# Patient Record
Sex: MALE | Race: WHITE | HISPANIC OR LATINO | Employment: FULL TIME | URBAN - METROPOLITAN AREA
[De-identification: names, ages, dates, MRNs, and addresses within clinical notes are randomized per-mention and may not be internally consistent; named-entity substitution may affect disease eponyms.]

---
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
## 2022-08-22 ENCOUNTER — HOSPITAL ENCOUNTER (EMERGENCY)
Facility: MEDICAL CENTER | Age: 22
End: 2022-08-22
Attending: EMERGENCY MEDICINE
Payer: COMMERCIAL

## 2022-08-22 ENCOUNTER — APPOINTMENT (OUTPATIENT)
Dept: RADIOLOGY | Facility: MEDICAL CENTER | Age: 22
End: 2022-08-22
Attending: EMERGENCY MEDICINE
Payer: COMMERCIAL

## 2022-08-22 VITALS
RESPIRATION RATE: 14 BRPM | OXYGEN SATURATION: 99 % | HEART RATE: 84 BPM | SYSTOLIC BLOOD PRESSURE: 108 MMHG | TEMPERATURE: 98 F | WEIGHT: 106.04 LBS | DIASTOLIC BLOOD PRESSURE: 69 MMHG

## 2022-08-22 DIAGNOSIS — S39.012A STRAIN OF LUMBAR REGION, INITIAL ENCOUNTER: ICD-10-CM

## 2022-08-22 PROCEDURE — 700102 HCHG RX REV CODE 250 W/ 637 OVERRIDE(OP): Performed by: EMERGENCY MEDICINE

## 2022-08-22 PROCEDURE — 99283 EMERGENCY DEPT VISIT LOW MDM: CPT

## 2022-08-22 PROCEDURE — A9270 NON-COVERED ITEM OR SERVICE: HCPCS | Performed by: EMERGENCY MEDICINE

## 2022-08-22 PROCEDURE — 73522 X-RAY EXAM HIPS BI 3-4 VIEWS: CPT

## 2022-08-22 RX ORDER — CYCLOBENZAPRINE HCL 5 MG
5-10 TABLET ORAL 3 TIMES DAILY PRN
Qty: 30 TABLET | Refills: 0 | Status: SHIPPED | OUTPATIENT
Start: 2022-08-22

## 2022-08-22 RX ORDER — OXYCODONE HYDROCHLORIDE 5 MG/1
5 TABLET ORAL ONCE
Status: COMPLETED | OUTPATIENT
Start: 2022-08-22 | End: 2022-08-22

## 2022-08-22 RX ORDER — ACETAMINOPHEN 325 MG/1
650 TABLET ORAL ONCE
Status: COMPLETED | OUTPATIENT
Start: 2022-08-22 | End: 2022-08-22

## 2022-08-22 RX ORDER — NAPROXEN 500 MG/1
500 TABLET ORAL 2 TIMES DAILY WITH MEALS
Qty: 20 TABLET | Refills: 0 | Status: SHIPPED | OUTPATIENT
Start: 2022-08-22 | End: 2022-09-01

## 2022-08-22 RX ORDER — IBUPROFEN 600 MG/1
600 TABLET ORAL ONCE
Status: COMPLETED | OUTPATIENT
Start: 2022-08-22 | End: 2022-08-22

## 2022-08-22 RX ADMIN — IBUPROFEN 600 MG: 600 TABLET ORAL at 18:21

## 2022-08-22 RX ADMIN — ACETAMINOPHEN 650 MG: 325 TABLET ORAL at 18:21

## 2022-08-22 RX ADMIN — OXYCODONE 5 MG: 5 TABLET ORAL at 18:21

## 2022-08-22 NOTE — LETTER
FORM C-4:  EMPLOYEE’S CLAIM FOR COMPENSATION/ REPORT OF INITIAL TREATMENT  EMPLOYEE’S CLAIM - PROVIDE ALL INFORMATION REQUESTED   First Name   Venkatesh Last Name   Raul Boyle Birthdate   2000  Sex   male Claim Number   Home Address [address]   [facility]             [place] [address]                                   Age  22 y.o. Height    Weight  48.1 kg (106 lb 0.7 oz) N  [id number]   Mailing Address [address]  [facility]              [place] [address] Telephone  [phone] (home)  Primary Language Spoken  Azerbaijani   Insurer   Third Party   KODAK BROWNE Employee's Occupation (Job Title) When Injury or Occupational Disease Occurred  [employer]   Employer's Name    [employer] Telephone    [phone]    Employer Address   [address] [facility] [address]] Zip   [address]   Date of Injury  8/22/2022       Hour of Injury  10:00 AM Date Employer Notified  8/22/2022 Last Day of Work after Injury or Occupational Disease  8/22/2022 Supervisor to Whom Injury Reported  Farhan   Address or Location of Accident (if applicable) Work [1]   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 Be specific and answer in detail. Use additional sheet if necessary)  Se callo un rollo y be golpeo la espalda andaba mucho tiempo agachado y estaba levando unas carpetas muy yadiel creo que tambien eso me molesto mas mi espalda   If you believe that you have an occupational disease, when did you first have knowledge of the disability and it relationship to your employment? n/a Witnesses to the Accident  [employer]   Nature of Injury or Occupational Disease  Sprain Part(s) of Body Injured or Affected  Lower Back Area (Lumbar Area & Lumbo-Sacral), Upper Back Area (Thoracic Area), Spinal Cord - Trunk    I CERTIFY THAT THE ABOVE IS TRUE  AND CORRECT TO THE BEST OF MY KNOWLEDGE AND THAT I HAVE PROVIDED THIS INFORMATION IN ORDER TO OBTAIN THE BENEFITS OF [place]  I HEREBY AUTHORIZE ANY PHYSICIAN, CHIROPRACTOR, SURGEON, PRACTITIONER, OR OTHER PERSON, ANY HOSPITAL, INCLUDING [facility] OR [facility], ANY MEDICAL SERVICE ORGANIZATION, ANY INSURANCE COMPANY, OR OTHER INSTITUTION OR ORGANIZATION TO RELEASE TO EACH OTHER, ANY MEDICAL OR OTHER INFORMATION, INCLUDING BENEFITS PAID OR PAYABLE, PERTINENT TO THIS INJURY OR DISEASE, EXCEPT INFORMATION RELATIVE TO DIAGNOSIS, TREATMENT AND/OR COUNSELING FOR AIDS, PSYCHOLOGICAL CONDITIONS, ALCOHOL OR CONTROLLED SUBSTANCES, FOR WHICH I MUST GIVE SPECIFIC AUTHORIZATION.  A PHOTOSTAT OF THIS AUTHORIZATION SHALL BE AS VALID AS THE ORIGINAL.  Date    08/25/2022            Formerly [facility], later [facility]               Employee’s Signature   THIS REPORT MUST BE COMPLETED AND MAILED WITHIN 3 WORKING DAYS OF TREATMENT   Place [facility], EMERGENCY DEPT                       Name of Facility [facility]   Date  8/22/2022 Diagnosis  (S39.012A) Strain of lumbar region, initial encounter Is there evidence the injured employee was under the influence of alcohol and/or another controlled substance at the time of accident?   Hour  5:55 PM Description of Injury or Disease  Strain of lumbar region, initial encounter No   Treatment  Oral pain medication  Have you advised the patient to remain off work five days or more?         No   X-Ray Findings  Negative If Yes   From Date    To Date      From information given by the employee, together with medical evidence, can you directly connect this injury or occupational disease as job incurred?   Yes If No, is employee capable of: Full Duty  Yes Modified Duty      Is additional medical care by a physician indicated?   Yes  "If Modified Duty, Specify any Limitations / Restrictions       Do you know of any previous injury or disease contributing to this condition or occupational disease?   No    Date   8/25/2022 Print Doctor’s Name   Marcell Scott certify the employer’s copy of this form was mailed on:   Address [address] [place] [address]  [place] [id number] [phone] INSURER’S USE ONLY   Provider’s Tax ID Number     [id number] Telephone Dept:   [phone]    Doctor’s Signature   e-MARCELL Banerjee M.D. Degree     MD      Form C-4 (rev.10/07)                                                                         BRIEF DESCRIPTION OF RIGHTS AND BENEFITS  ([place]ursuant to [place] 616[place])    Notice of Injury or Occupational Disease (Incident Report Form C-1): If an injury or occupational disease (OD) arises out of and in the course of employment, you must provide written notice to your employer as soon as practicable, but no later than 7 days after the accident or OD. Your employer shall maintain a sufficient supply of the required forms.    Claim for Compensation (Form C-4): If medical treatment is sought, the form C-4 is available at the place of initial treatment. A completed \"Claim for Compensation\" (Form C-4) must be filed within 90 days after an accident or OD. The treating physician or chiropractor must, within 3 working days after treatment, complete and mail to the employer, the employer's insurer and third-party , the Claim for Compensation.    Medical Treatment: If you require medical treatment for your on-the-job injury or OD, you may be required to select a physician or chiropractor from a list provided by your workers’ compensation insurer, if it has contracted with an Organization for Managed Care (MCO) or Preferred Provider Organization (PPO) or providers of health care. If your employer has not entered into a contract with an MCO or PPO, you may select a physician or chiropractor from the Panel of Physicians and " Chiropractors. Any medical costs related to your industrial injury or OD will be paid by your insurer.    Temporary Total Disability (TTD): If your doctor has certified that you are unable to work for a period of at least 5 consecutive days, or 5 cumulative days in a 20-day period, or places restrictions on you that your employer does not accommodate, you may be entitled to TTD compensation.    Temporary Partial Disability (TPD): If the wage you receive upon reemployment is less than the compensation for TTD to which you are entitled, the insurer may be required to pay you TPD compensation to make up the difference. TPD can only be paid for a maximum of 24 months.    Permanent Partial Disability (PPD): When your medical condition is stable and there is an indication of a PPD as a result of your injury or OD, within 30 days, your insurer must arrange for an evaluation by a rating physician or chiropractor to determine the degree of your PPD. The amount of your PPD award depends on the date of injury, the results of the PPD evaluation, your age and wage.    Permanent Total Disability (PTD): If you are medically certified by a treating physician or chiropractor as permanently and totally disabled and have been granted a PTD status by your insurer, you are entitled to receive monthly benefits not to exceed 66 2/3% of your average monthly wage. The amount of your PTD payments is subject to reduction if you previously received a lump-sum PPD award.    Vocational Rehabilitation Services: You may be eligible for vocational rehabilitation services if you are unable to return to the job due to a permanent physical impairment or permanent restrictions as a result of your injury or occupational disease.    Transportation and Per Dameon Reimbursement: You may be eligible for travel expenses and per dameon associated with medical treatment.    Reopening: You may be able to reopen your claim if your condition worsens after claim  closure.     Appeal Process: If you disagree with a written determination issued by the insurer or the insurer does not respond to your request, you may appeal to the Department of Administration, , by following the instructions contained in your determination letter. You must appeal the determination within 70 days from the date of the determination letter at [address], [address], [place], [place] [address], or [address], [address], [place], [place] [address]. If you disagree with the  decision, you may appeal to the Department of Administration, . You must file your appeal within 30 days from the date of the  decision letter at [address], [address], [place], [place] [address], or [address], [address], [place], [place] [address]. If you disagree with a decision of an , you may file a petition for judicial review with the District Court. You must do so within 30 days of the Appeal Officer’s decision. You may be represented by an  at your own expense or you may contact the [facility] for possible representation.    [place]  for Injured Workers (NAIW): If you disagree with a  decision, you may request that NAIW represent you without charge at an  Hearing. For information regarding denial of benefits, you may contact the [facility] at: [address], [address], [place] [address], [phone]) [phone], or [address], [address], [place] [address], [phone]    To File a Complaint with the Division: If you wish to file a complaint with the  of the Division of Industrial Relations (DIR),  please contact the Workers’ Compensation Section, [address], [address], [place], [place] [address], telephone (925) [phone], or [address], [place], [place] [address], telephone ([phone]) [phone].    For assistance with Workers’  Compensation Issues: You may contact the [facility] Office for Consumer Health Assistance, [facility] [facility], [address], [place] [address], Toll Free 1-221.161.3736, Web site: http://[facility].[place].gov/Programs/ELSA E-mail: elsa@[facility].[place].gov  D-2 (rev. 10/20)              __________________________________________________________________                                    _________________            Employee Name / Signature                                                                                                                            Date

## 2022-08-22 NOTE — LETTER
FORM C-4:  EMPLOYEE’S CLAIM FOR COMPENSATION/ REPORT OF INITIAL TREATMENT  EMPLOYEE’S CLAIM - PROVIDE ALL INFORMATION REQUESTED   First Name Venkatesh Last Name Raul Birthdate 2000  Sex male Claim Number   Home Address [address]   [facility]             Zip [address]                                   Age  22 y.o. Height    Weight  48.1 kg (106 lb 0.7 oz) SSN  unknown   Mailing Address [address]  [facility]              Zip [address] Telephone  [phone] (home)  Primary Language Spoken   Insurer Third Party   ACCESS TO HEALTHCARE Employee's Occupation (Job Title) When Injury or Occupational Disease Occurred     Employer's Name  Telephone     Employer Address  City  State  Zip    Date of Injury  8/22/2022       Hour of Injury  10:00 AM Date Employer Notified  8/22/2022 Last Day of Work after Injury or Occupational Disease  8/22/2022 Supervisor to Whom Injury Reported  Farhan   Address or Location of Accident (if applicable) Work [1]   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 Be specific and answer in detail. Use additional sheet if necessary)  Se callo un rollo y be golpeo la espalda andaba mucho tiempo agachado y estaba levando unas carpetas muy yadiel creo que tambien eso me molesto mas mi espalda   If you believe that you have an occupational disease, when did you first have knowledge of the disability and it relationship to your employment? workers comp injury Witnesses to the Accident  work friend   Nature of Injury or Occupational Disease  Sprain Part(s) of Body Injured or Affected  Lower Back Area (Lumbar Area & Lumbo-Sacral), Upper Back Area (Thoracic Area), Spinal Cord - Trunk    I CERTIFY THAT THE ABOVE IS TRUE AND CORRECT TO THE BEST OF MY KNOWLEDGE AND THAT I HAVE PROVIDED THIS INFORMATION IN ORDER TO OBTAIN THE BENEFITS OF [facility]  INDUSTRIAL INSURANCE AND [place]CCUPATIONAL [place]EAS[place]IVE OR CHAPTER 617 OF [place]).  I HEREBY AUTHORIZE ANY PHYSICIAN, CHIROPRACTOR, SURGEON, PRACTITIONER, OR OTHER PERSON, ANY HOSPITAL, INCLUDING [facility] OR [facility], ANY MEDICAL SERVICE ORGANIZATION, ANY INSURANCE COMPANY, OR OTHER INSTITUTION OR ORGANIZATION TO RELEASE TO EACH OTHER, ANY MEDICAL OR OTHER INFORMATION, INCLUDING BENEFITS PAID OR PAYABLE, PERTINENT TO THIS INJURY OR DISEASE, EXCEPT INFORMATION RELATIVE TO DIAGNOSIS, TREATMENT AND/OR COUNSELING FOR AIDS, PSYCHOLOGICAL CONDITIONS, ALCOHOL OR CONTROLLED SUBSTANCES, FOR WHICH I MUST GIVE SPECIFIC AUTHORIZATION.  A PHOTOSTAT OF THIS AUTHORIZATION SHALL BE AS VALID AS THE ORIGINAL.  Date                                      Place                                                                             Employee’s Signature   THIS REPORT MUST BE COMPLETED AND MAILED WITHIN 3 WORKING DAYS OF TREATMENT   Place [facility], EMERGENCY DEPT                       Name of Facility [facility]   Date  8/22/2022 Diagnosis  (S39.012A) Strain of lumbar region, initial encounter Is there evidence the injured employee was under the influence of alcohol and/or another controlled substance at the time of accident?   Hour  8:55 PM Description of Injury or Disease  Strain of lumbar region, initial encounter No   Treatment  Oral pain medication  Have you advised the patient to remain off work five days or more?         No   X-Ray Findings  Negative If Yes   From Date    To Date      From information given by the employee, together with medical evidence, can you directly connect this injury or occupational disease as job incurred? Yes If No, is employee capable of: Full Duty  Yes Modified Duty      Is additional medical care by a physician indicated? Yes If Modified Duty, Specify any Limitations / Restrictions       Do you know of any  "previous injury or disease contributing to this condition or occupational disease? No    Date 8/22/2022 Print Doctor’s Name Sonia Marcellmarika GUTIERREZ certify the employer’s copy of this form was mailed on:   Address [address] [place] [address]  [place] [address] [phone] INSURER’S USE ONLY   Provider’s Tax ID Number   Telephone Dept: [phone]    Doctor’s Signature e-MARCELL Banerjee M.D. Degree  M.D      Form C-4 (rev.10/07)                                                                         BRIEF DESCRIPTION OF RIGHTS AND BENEFITS  (Pursuant to [place]RS 616C.050)    Notice of Injury or Occupational Disease (Incident Report Form C-1): If an injury or occupational disease (OD) arises out of and in the course of employment, you must provide written notice to your employer as soon as practicable, but no later than 7 days after the accident or OD. Your employer shall maintain a sufficient supply of the required forms.    Claim for Compensation (Form C-4): If medical treatment is sought, the form C-4 is available at the place of initial treatment. A completed \"Claim for Compensation\" (Form C-4) must be filed within 90 days after an accident or OD. The treating physician or chiropractor must, within 3 working days after treatment, complete and mail to the employer, the employer's insurer and third-party , the Claim for Compensation.    Medical Treatment: If you require medical treatment for your on-the-job injury or OD, you may be required to select a physician or chiropractor from a list provided by your workers’ compensation insurer, if it has contracted with an Organization for Managed Care (MCO) or Preferred Provider Organization (PPO) or providers of health care. If your employer has not entered into a contract with an MCO or PPO, you may select a physician or chiropractor from the Panel of Physicians and Chiropractors. Any medical costs related to your industrial injury or OD will be paid by your " insurer.    Temporary Total Disability (TTD): If your doctor has certified that you are unable to work for a period of at least 5 consecutive days, or 5 cumulative days in a 20-day period, or places restrictions on you that your employer does not accommodate, you may be entitled to TTD compensation.    Temporary Partial Disability (TPD): If the wage you receive upon reemployment is less than the compensation for TTD to which you are entitled, the insurer may be required to pay you TPD compensation to make up the difference. TPD can only be paid for a maximum of 24 months.    Permanent Partial Disability (PPD): When your medical condition is stable and there is an indication of a PPD as a result of your injury or OD, within 30 days, your insurer must arrange for an evaluation by a rating physician or chiropractor to determine the degree of your PPD. The amount of your PPD award depends on the date of injury, the results of the PPD evaluation, your age and wage.    Permanent Total Disability (PTD): If you are medically certified by a treating physician or chiropractor as permanently and totally disabled and have been granted a PTD status by your insurer, you are entitled to receive monthly benefits not to exceed 66 2/3% of your average monthly wage. The amount of your PTD payments is subject to reduction if you previously received a lump-sum PPD award.    Vocational Rehabilitation Services: You may be eligible for vocational rehabilitation services if you are unable to return to the job due to a permanent physical impairment or permanent restrictions as a result of your injury or occupational disease.    Transportation and Per Dameon Reimbursement: You may be eligible for travel expenses and per dameon associated with medical treatment.    Reopening: You may be able to reopen your claim if your condition worsens after claim closure.     Appeal Process: If you disagree with a written determination issued by the insurer or  the insurer does not respond to your request, you may appeal to the Department of Administration, , by following the instructions contained in your determination letter. You must appeal the determination within 70 days from the date of the determination letter at [address], [address], [place], [place] [address], or [address], [address], [place], [place] [address]. If you disagree with the  decision, you may appeal to the Department of Administration, . You must file your appeal within 30 days from the date of the  decision letter at [address], [address], [place], [place] [address], or [address], [address], [place], [place] [address]. If you disagree with a decision of an , you may file a petition for judicial review with the District Court. You must do so within 30 days of the Appeal Officer’s decision. You may be represented by an  at your own expense or you may contact the [facility] for possible representation.    [place]  for Injured Workers (NAIW): If you disagree with a  decision, you may request that NAIW represent you without charge at an  Hearing. For information regarding denial of benefits, you may contact the [facility] at: [address], [address], [place] [address], [phone], or [address], [address], [place] [address], [phone]    To File a Complaint with the Division: If you wish to file a complaint with the  of the Division of Industrial Relations (DIR),  please contact the Workers’ Compensation Section, [address], [address], [place], [place] [address], telephone [phone], or [address] [facility], [facility] [address], [place], [place] [address], telephone [phone].    For assistance with Workers’ Compensation Issues: You may contact the [facility] Office for Consumer Health Assistance,  [facility] [facility], [facility] [address], [place] [address], Toll Free 1-457.848.6641, Web site: http://[facility].[place].gov/Programs/ELSA E-mail: elsa@[facility].nv.gov  D-2 (rev. 10/20)              __________________________________________________________________                                    _________________            Employee Name / Signature                                                                                                                            Date

## 2022-08-22 NOTE — LETTER
FORM C-4:  EMPLOYEE’S CLAIM FOR COMPENSATION/ REPORT OF INITIAL TREATMENT  EMPLOYEE’S CLAIM - PROVIDE ALL INFORMATION REQUESTED   First Name Venkatesh Last Name Raul Boyle Birthdate 2000  Sex male Claim Number   Home Address [address]   [place]             [place] [address]                                   Age  22 y.o. 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 Be specific and answer in detail. Use additional sheet if necessary)  Se callo un rollo y be golpeo la espalda andaba mucho tiempo agachado y estaba levando unas carpetas muy yadiel creo que tambien eso me molesto mas mi espalda   If you believe that you have an occupational disease, when did you first have knowledge of the disability and it relationship to your employment? n/a Witnesses to the Accident  [employer]   Nature of Injury or Occupational Disease  Sprain Part(s) of Body Injured or Affected  Lower Back Area (Lumbar Area & Lumbo-Sacral), Upper Back Area (Thoracic Area), Spinal Cord - Trunk    I CERTIFY  THAT THE ABOVE IS TRUE AND CORRECT TO THE BEST OF MY KNOWLEDGE AND THAT I HAVE PROVIDED THIS INFORMATION IN ORDER TO OBTAIN THE BENEFITS OF [place] AND [place]CCUP[place]EAS[place]S [place] 616[place], [place]IVE [place] CHAPTER 617 OF [place]).  I HEREBY AUTHORIZE ANY PHYSICIAN, CHIROPRACTOR, SURGEON, PRACTITIONER, OR OTHER PERSON, ANY HOSPITAL, INCLUDING [facility] OR [facility], ANY MEDICAL SERVICE ORGANIZATION, ANY INSURANCE COMPANY, OR OTHER INSTITUTION OR ORGANIZATION TO RELEASE TO EACH OTHER, ANY MEDICAL OR OTHER INFORMATION, INCLUDING BENEFITS PAID OR PAYABLE, PERTINENT TO THIS INJURY OR DISEASE, EXCEPT INFORMATION RELATIVE TO DIAGNOSIS, TREATMENT AND/OR COUNSELING FOR AIDS, PSYCHOLOGICAL CONDITIONS, ALCOHOL OR CONTROLLED SUBSTANCES, FOR WHICH I MUST GIVE SPECIFIC AUTHORIZATION.  A PHOTOSTAT OF THIS AUTHORIZATION SHALL BE AS VALID AS THE ORIGINAL.  Date 8/23/2022      [facility]   Employee’s Signature   THIS REPORT MUST BE COMPLETED AND MAILED WITHIN 3 WORKING DAYS OF TREATMENT   Place [facility], EMERGENCY DEPT                       Name of Facility [facility]   Date  8/22/2022 Diagnosis  (S39.012A) Strain of lumbar region, initial encounter Is there evidence the injured employee was under the influence of alcohol and/or another controlled substance at the time of accident?   Hour  2:47 PM Description of Injury or Disease  Strain of lumbar region, initial encounter No   Treatment  Oral pain medication  Have you advised the patient to remain off work five days or more?         No   X-Ray Findings  Negative If Yes   From Date    To Date      From information given by the employee, together with medical evidence, can you directly connect this injury or occupational disease as job incurred? Yes If No, is employee capable of: Full Duty  Yes Modified Duty      Is additional medical care by a physician indicated? Yes If  "Modified Duty, Specify any Limitations / Restrictions       Do you know of any previous injury or disease contributing to this condition or occupational disease? No    Date 8/23/2022 Print Doctor’s Name Marcell Scott S MATT certify the employer’s copy of this form was mailed on:   Address [address] [place] [address]  [place] [id number] [phone] INSURER’S USE ONLY   Provider’s Tax ID Number  [id number] Telephone Dept: [phone]    Doctor’s Signature e-MARCELL Banerjee M.D. Degree  M.D.      Form C-4 (rev.10/07)                                                                         BRIEF DESCRIPTION OF RIGHTS AND BENEFITS  ([place]ursuant to [place]RS 616C.050)    Notice of Injury or Occupational Disease (Incident Report Form C-1): If an injury or occupational disease (OD) arises out of and in the course of employment, you must provide written notice to your employer as soon as practicable, but no later than 7 days after the accident or OD. Your employer shall maintain a sufficient supply of the required forms.    Claim for Compensation (Form C-4): If medical treatment is sought, the form C-4 is available at the place of initial treatment. A completed \"Claim for Compensation\" (Form C-4) must be filed within 90 days after an accident or OD. The treating physician or chiropractor must, within 3 working days after treatment, complete and mail to the employer, the employer's insurer and third-party , the Claim for Compensation.    Medical Treatment: If you require medical treatment for your on-the-job injury or OD, you may be required to select a physician or chiropractor from a list provided by your workers’ compensation insurer, if it has contracted with an Organization for Managed Care (MCO) or Preferred Provider Organization (PPO) or providers of health care. If your employer has not entered into a contract with an MCO or PPO, you may select a physician or chiropractor from the Panel of Physicians and Chiropractors. Any " medical costs related to your industrial injury or OD will be paid by your insurer.    Temporary Total Disability (TTD): If your doctor has certified that you are unable to work for a period of at least 5 consecutive days, or 5 cumulative days in a 20-day period, or places restrictions on you that your employer does not accommodate, you may be entitled to TTD compensation.    Temporary Partial Disability (TPD): If the wage you receive upon reemployment is less than the compensation for TTD to which you are entitled, the insurer may be required to pay you TPD compensation to make up the difference. TPD can only be paid for a maximum of 24 months.    Permanent Partial Disability (PPD): When your medical condition is stable and there is an indication of a PPD as a result of your injury or OD, within 30 days, your insurer must arrange for an evaluation by a rating physician or chiropractor to determine the degree of your PPD. The amount of your PPD award depends on the date of injury, the results of the PPD evaluation, your age and wage.    Permanent Total Disability (PTD): If you are medically certified by a treating physician or chiropractor as permanently and totally disabled and have been granted a PTD status by your insurer, you are entitled to receive monthly benefits not to exceed 66 2/3% of your average monthly wage. The amount of your PTD payments is subject to reduction if you previously received a lump-sum PPD award.    Vocational Rehabilitation Services: You may be eligible for vocational rehabilitation services if you are unable to return to the job due to a permanent physical impairment or permanent restrictions as a result of your injury or occupational disease.    Transportation and Per Dameon Reimbursement: You may be eligible for travel expenses and per dameon associated with medical treatment.    Reopening: You may be able to reopen your claim if your condition worsens after claim closure.     Appeal  Process: If you disagree with a written determination issued by the insurer or the insurer does not respond to your request, you may appeal to the Department of Administration, , by following the instructions contained in your determination letter. You must appeal the determination within 70 days from the date of the determination letter at [address], [address], [place], [place] [address], or [address], [place], [place] [address]. If you disagree with the  decision, you may appeal to the Department of Administration, . You must file your appeal within 30 days from the date of the  decision letter at [address], [address], [place], [place] [address], or [address] [facility], [facility] [address], [place], [place] [address]. If you disagree with a decision of an , you may file a petition for judicial review with the District Court. You must do so within 30 days of the Appeal Officer’s decision. You may be represented by an  at your own expense or you may contact the [facility] for possible representation.    [place]  for Injured Workers (NAIW): If you disagree with a  decision, you may request that NAIW represent you without charge at an  Hearing. For information regarding denial of benefits, you may contact the [facility] at: [address], [address], [place] [address], [phone], or [address], [address], [place] [address], [phone]    To File a Complaint with the Division: If you wish to file a complaint with the  of the Division of Industrial Relations (DIR),  please contact the Workers’ Compensation Section, [address] [facility], [facility] [address], [place], [place] [address], telephone (809) [phone], or [address] [facility], [facility] [address], [place], [place] [address], telephone [phone].    For assistance with Workers’ Compensation  Issues: You may contact the [facility] Office for Consumer Health Assistance, [facility] [facility], [facility] [address], [place] [address], Toll Free 1-925.580.4634, Web site: http://[facility].[place].gov/Programs/ELSA E-mail: elsa@[email or web address].[email or web address].gov  D-2 (rev. 10/20)              8/23/2022            __________________________________________________________________                                    _________________            Employee Name / Signature                                                                                                                            Date

## 2022-08-22 NOTE — LETTER
FORM C-4:  EMPLOYEE’S CLAIM FOR COMPENSATION/ REPORT OF INITIAL TREATMENT  EMPLOYEE’S CLAIM - PROVIDE ALL INFORMATION REQUESTED   First Name Venkatesh Last Name Raul Birthdate 2000  Sex male Claim Number   Home Address [address]   [facility] [place]             [place] [address]                                   Age  22 y.o. Height    Weight  48.1 kg (106 lb 0.7 oz) [facility]  [id number]   Mailing Address [address]  [facility] [place]              Zip [address] Telephone  [phone] (home)  Primary Language Spoken   Insurer  *** Third Party   ACCESS TO HEALTHCARE Employee's Occupation (Job Title) When Injury or Occupational Disease Occurred     Employer's Name [employer] Telephone     Employer Address  City  State  Zip    Date of Injury         Hour of Injury   Date Employer Notified   Last Day of Work after Injury or Occupational Disease   Supervisor to Whom Injury Reported     Address or Location of Accident (if applicable)    What were you doing at the time of accident? (if applicable)     How did this injury or occupational disease occur? Be specific and answer in detail. Use additional sheet if necessary)     If you believe that you have an occupational disease, when did you first have knowledge of the disability and it relationship to your employment?  Witnesses to the Accident     Nature of Injury or Occupational Disease   Part(s) of Body Injured or Affected  , ,     I CERTIFY THAT THE ABOVE IS TRUE AND CORRECT TO THE BEST OF MY KNOWLEDGE AND THAT I HAVE PROVIDED THIS INFORMATION IN ORDER TO OBTAIN THE BENEFITS OF [place]  I HEREBY AUTHORIZE ANY PHYSICIAN, CHIROPRACTOR, SURGEON, PRACTITIONER, OR OTHER PERSON, ANY HOSPITAL, INCLUDING [facility] OR [facility], ANY MEDICAL SERVICE ORGANIZATION, ANY INSURANCE COMPANY, OR OTHER INSTITUTION OR  ORGANIZATION TO RELEASE TO EACH OTHER, ANY MEDICAL OR OTHER INFORMATION, INCLUDING BENEFITS PAID OR PAYABLE, PERTINENT TO THIS INJURY OR DISEASE, EXCEPT INFORMATION RELATIVE TO DIAGNOSIS, TREATMENT AND/OR COUNSELING FOR AIDS, PSYCHOLOGICAL CONDITIONS, ALCOHOL OR CONTROLLED SUBSTANCES, FOR WHICH I MUST GIVE SPECIFIC AUTHORIZATION.  A PHOTOSTAT OF THIS AUTHORIZATION SHALL BE AS VALID AS THE ORIGINAL.  Date                                      Place                                                                             Employee’s Signature   THIS REPORT MUST BE COMPLETED AND MAILED WITHIN 3 WORKING DAYS OF TREATMENT   Place [facility], EMERGENCY DEPT                       Name of Facility [facility]   Date  8/22/2022 Diagnosis  (S39.012A) Strain of lumbar region, initial encounter Is there evidence the injured employee was under the influence of alcohol and/or another controlled substance at the time of accident?   Hour  7:31 PM Description of Injury or Disease  Strain of lumbar region, initial encounter     Treatment     Have you advised the patient to remain off work five days or more?             X-Ray Findings    If Yes   From Date    To Date      From information given by the employee, together with medical evidence, can you directly connect this injury or occupational disease as job incurred?   If No, is employee capable of: Full Duty    Modified Duty      Is additional medical care by a physician indicated?   If Modified Duty, Specify any Limitations / Restrictions       Do you know of any previous injury or disease contributing to this condition or occupational disease?      Date 8/22/2022 Print Doctor’s Name Vinay Scott I certify the employer’s copy of this form was mailed on:   Address [address] [place] [address] [id number] [phone] INSURER’S USE ONLY   Provider’s Tax ID Number   Telephone Dept: [phone]    Doctor’s Signature   Degree        Form C-4  "(rev.10/07)                                                                         BRIEF DESCRIPTION OF RIGHTS AND BENEFITS  (Pursuant to [place])    Notice of Injury or Occupational Disease (Incident Report Form C-1): If an injury or occupational disease (OD) arises out of and in the course of employment, you must provide written notice to your employer as soon as practicable, but no later than 7 days after the accident or OD. Your employer shall maintain a sufficient supply of the required forms.    Claim for Compensation (Form C-4): If medical treatment is sought, the form C-4 is available at the place of initial treatment. A completed \"Claim for Compensation\" (Form C-4) must be filed within 90 days after an accident or OD. The treating physician or chiropractor must, within 3 working days after treatment, complete and mail to the employer, the employer's insurer and third-party , the Claim for Compensation.    Medical Treatment: If you require medical treatment for your on-the-job injury or OD, you may be required to select a physician or chiropractor from a list provided by your workers’ compensation insurer, if it has contracted with an Organization for Managed Care (MCO) or Preferred Provider Organization (PPO) or providers of health care. If your employer has not entered into a contract with an MCO or PPO, you may select a physician or chiropractor from the Panel of Physicians and Chiropractors. Any medical costs related to your industrial injury or OD will be paid by your insurer.    Temporary Total Disability (TTD): If your doctor has certified that you are unable to work for a period of at least 5 consecutive days, or 5 cumulative days in a 20-day period, or places restrictions on you that your employer does not accommodate, you may be entitled to TTD compensation.    Temporary Partial Disability (TPD): If the wage you receive upon reemployment is less than the compensation for TTD to " which you are entitled, the insurer may be required to pay you TPD compensation to make up the difference. TPD can only be paid for a maximum of 24 months.    Permanent Partial Disability (PPD): When your medical condition is stable and there is an indication of a PPD as a result of your injury or OD, within 30 days, your insurer must arrange for an evaluation by a rating physician or chiropractor to determine the degree of your PPD. The amount of your PPD award depends on the date of injury, the results of the PPD evaluation, your age and wage.    Permanent Total Disability (PTD): If you are medically certified by a treating physician or chiropractor as permanently and totally disabled and have been granted a PTD status by your insurer, you are entitled to receive monthly benefits not to exceed 66 2/3% of your average monthly wage. The amount of your PTD payments is subject to reduction if you previously received a lump-sum PPD award.    Vocational Rehabilitation Services: You may be eligible for vocational rehabilitation services if you are unable to return to the job due to a permanent physical impairment or permanent restrictions as a result of your injury or occupational disease.    Transportation and Per [place] Reimbursement: You may be eligible for travel expenses and per dameon associated with medical treatment.    Reopening: You may be able to reopen your claim if your condition worsens after claim closure.     Appeal Process: If you disagree with a written determination issued by the insurer or the insurer does not respond to your request, you may appeal to the Department of Administration, , by following the instructions contained in your determination letter. You must appeal the determination within 70 days from the date of the determination letter at [address], [address], [place], [place] [address], or [address], [place], [place] [address]. If you disagree with  the  decision, you may appeal to the Department of Administration, . You must file your appeal within 30 days from the date of the  decision letter at [address], [address], [place], [place] [address], or [address], [place], [place] [address]. If you disagree with a decision of an , you may file a petition for judicial review with the District Court. You must do so within 30 days of the Appeal Officer’s decision. You may be represented by an  at your own expense or you may contact the [facility] for possible representation.    [place]  for Injured Workers (NAIW): If you disagree with a  decision, you may request that NAIW represent you without charge at an  Hearing. For information regarding denial of benefits, you may contact the [facility] at: [address], [address], [place] [address], [phone], or [address], [place] [address], [phone]    To File a Complaint with the Division: If you wish to file a complaint with the  of the Division of Industrial Relations (DIR),  please contact the Workers’ Compensation Section, [address], [address], [place], [place] [address], telephone [phone], or [address], [address], [place], [place] [address], telephone [phone].    For assistance with Workers’ Compensation Issues: You may contact the [facility] Office for Consumer Health Assistance, [address], [address], [place] [address], Toll Free 1-775.151.1760, Web site: http://[facility].[place].gov/Programs/ELSA E-mail: elsa@[facility].nv.gov  D-2 (rev. 10/20)              __________________________________________________________________                                    _________________            Employee Name / Signature                                                                                                                             Date

## 2022-08-23 NOTE — DISCHARGE INSTRUCTIONS
You were seen in the ER for low back pain after trauma at work.  Thankfully, your x-ray is normal and does not suggest a fracture or break in your bone.  We did give you a dose of pain medication in the ER, please do not drink alcohol or drive tonight as it can make you sleepy.  I have sent in a prescription for naproxen and Flexeril for your discomfort, please take them as directed.  Please do not drink alcohol or drive after taking the Flexeril which is a muscle relaxer as it can make you sleepy.  You should follow-up with occupational health tomorrow as they will help with further Worker's Compensation issues.  Follow-up with your primary care physician as well.  Return with any new or worsening symptoms.  Good luck, I hope you feel better soon!

## 2022-08-23 NOTE — ED TRIAGE NOTES
Chief Complaint   Patient presents with    T-5000     Pt was at work when a large roll of carpet fell on his back. Pain to left lower back     Pt ambulates to triage with above complaint. Denies NT.   /76   Pulse (!) 54   Temp 36.6 °C (97.9 °F) (Temporal)   Resp 14   Wt 48.1 kg (106 lb 0.7 oz)   SpO2 97%   Pt informed of wait times. Educated on triage process.  Asked to return to triage RN for any new or worsening of symptoms. Thanked for patience.

## 2022-08-23 NOTE — ED PROVIDER NOTES
ED Provider Note    CHIEF COMPLAINT  Chief Complaint   Patient presents with    T-5000     Pt was at work when a large roll of carpet fell on his back. Pain to left lower back       HPI  Venkatesh Carter is a 22 y.o. male who presents with a chief complaint of left low back pain that started after he was lifting heavy items while at work.  Additionally, a roll of carpet hit him in the left low back, contrary to what the nursing staff chief complaint states he denies that the roll of carpet fell on his back.  He denies any weakness or numbness in his extremities.  He has not tried any medication for his symptoms.    REVIEW OF SYSTEMS  See HPI for further details.  Left low back pain.  All other systems are negative.     PAST MEDICAL HISTORY       SOCIAL HISTORY  Social History     Tobacco Use    Smoking status: Never    Smokeless tobacco: Never   Substance and Sexual Activity    Alcohol use: No    Drug use: No    Sexual activity: Never       SURGICAL HISTORY  patient denies any surgical history    CURRENT MEDICATIONS  Home Medications       Reviewed by Charu Barber R.N. (Registered Nurse) on 08/22/22 at 1732  Med List Status: Partial     Medication Last Dose Status        Patient Mundo Taking any Medications                           ALLERGIES  No Known Allergies    PHYSICAL EXAM  VITAL SIGNS: /76   Pulse (!) 54   Temp 36.6 °C (97.9 °F) (Temporal)   Resp 14   Wt 48.1 kg (106 lb 0.7 oz)   SpO2 97%    Pulse ox interpretation: I interpret this pulse ox as normal.  Constitutional: Alert in no apparent distress.  HENT: Normocephalic, atraumatic, bilateral external ears normal. Mucous membranes moist. Nose normal.   Eyes: Pupils are equal and reactive. Conjunctiva normal, non-icteric.   Heart: Regular rate and rythm, no murmurs.    Lungs: Clear to auscultation bilaterally.  Skin: Warm, dry, no erythema, no rash.  Back: No cervical, thoracic, or lumbar spine bony tenderness or step-offs.  There is left  lumbar paraspinal tenderness without obvious external trauma.  Neurologic: Alert, grossly non-focal.  Full strength and sensation in bilateral lower extremities.  Psychiatric: Affect normal, judgment normal, mood normal, appears appropriate and not intoxicated.     COURSE & MEDICAL DECISION MAKING  Pertinent Labs & Imaging studies reviewed. (See chart for details)  This is a 22-year-old male who is here with left low back pain after he was struck by a roll of carpet in his left low back while at work which was then exacerbated by picking up heavy items.  The pain is not radicular and he denies any extremity weakness or numbness.  Low suspicion for spinal cord injury.  He has no bony spine tenderness in the cervical, thoracic, or lumbar spine.  This is unlikely to be a fracture.  He is tender over the left side of the pelvis but is standing without difficulty.  He has full strength and sensation in his bilateral lower extremities.  Plan will be for oral pain medication and x-ray of the pelvis.    Thankfully x-ray is without acute bony abnormality.  He is safe for discharge with close outpatient management.  He was given a prescription for Flexeril and naproxen and instructed to take these as directed.  I have instructed him to follow-up with occupational health tomorrow.  He was discharged in good and stable condition with strict return precautions.    The patient will not drink alcohol nor drive with prescribed medications. The patient will return for worsening symptoms and is stable at the time of discharge. The patient verbalizes understanding and will comply.    FINAL IMPRESSION  1. Strain of lumbar region, initial encounter  naproxen (NAPROSYN) 500 MG Tab    cyclobenzaprine (FLEXERIL) 5 mg tablet            Electronically signed by: Vinay Scott M.D., 8/22/2022 6:14 PM